# Patient Record
Sex: MALE | Race: WHITE
[De-identification: names, ages, dates, MRNs, and addresses within clinical notes are randomized per-mention and may not be internally consistent; named-entity substitution may affect disease eponyms.]

---

## 2020-01-17 ENCOUNTER — HOSPITAL ENCOUNTER (EMERGENCY)
Dept: HOSPITAL 72 - EMR | Age: 40
Discharge: HOME | End: 2020-01-17
Payer: COMMERCIAL

## 2020-01-17 VITALS — HEIGHT: 74 IN | BODY MASS INDEX: 23.74 KG/M2 | WEIGHT: 185 LBS

## 2020-01-17 VITALS — SYSTOLIC BLOOD PRESSURE: 145 MMHG | DIASTOLIC BLOOD PRESSURE: 82 MMHG

## 2020-01-17 VITALS — SYSTOLIC BLOOD PRESSURE: 157 MMHG | DIASTOLIC BLOOD PRESSURE: 99 MMHG
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
## 2020-01-17 DIAGNOSIS — M54.5: ICD-10-CM

## 2020-01-17 DIAGNOSIS — S30.0XXA: Primary | ICD-10-CM
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
## 2020-01-17 DIAGNOSIS — F98.8: ICD-10-CM
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
## 2020-01-17 PROCEDURE — 99284 EMERGENCY DEPT VISIT MOD MDM: CPT

## 2020-01-17 PROCEDURE — 72131 CT LUMBAR SPINE W/O DYE: CPT

## 2020-01-17 PROCEDURE — 72125 CT NECK SPINE W/O DYE: CPT

## 2020-01-17 PROCEDURE — 70450 CT HEAD/BRAIN W/O DYE: CPT

## 2020-01-17 NOTE — NUR
ED Nurse Note:



System down at this time, unable to scan medications. Pt given Toradol 30mg IM, 
Hydromorphone 0.5mg IM, Robaxin 750mg PO, Tylenol 1000mg PO, zofran 4mg PO, 
Decadron 12mg PO and Lidoderm patch 5% as ordered by ROBERT Baron. Pt 
tolerated medication well, charge rn aware of override in pyxis.

## 2020-01-17 NOTE — DIAGNOSTIC IMAGING REPORT
Indications: Head pain after falling

 

Technique: Spiral acquisitions obtained through the brain. Angled axial and coronal 5

x 5 mm slices were reconstructed. Total dose length product 1457 mGycm.  CTDI vol(s)

62 mGy. Dose reduction achieved using automated exposure control

 

Comparison: None.

 

Findings: No acute intracranial hemorrhage or edema. No mass effect nor midline

shift. Normal size ventricles and extra axial CSF spaces. Normal gray-white

differentiation. Intact calvarium. Visualized orbits and sinuses are unremarkable.

 

Impression: Negative

 

This agrees with the preliminary interpretation provided overnight by [facility]

teleradiology service.

 

 

 

The CT scanner at [facility] is accredited by the American College of

Radiology and the scans are performed using protocols designed to limit radiation

exposure to as low as reasonably achievable to attain images of sufficient resolution

adequate for diagnostic evaluation.

## 2020-01-17 NOTE — EMERGENCY ROOM REPORT
History of Present Illness


General


Chief Complaint:  Multiple Trauma/Fall


Source:  Patient





Present Illness


HPI


39-year-old male works at [employer] presents with acute slip and fall, fell 

on his back hit his head, no LOC he endorses some nausea no vomiting he 

endorses posterior head pain, no aggravating leaving factors severity is 

moderate, intermittent , patient also endorses right lower back pain sharp in 

nature aggravated with movement alleviated with rest severity is moderate no 

bowel bladder retention/incontinence no perineal numbness,


Allergies:  


Coded Allergies:  


     No Known Allergies (Unverified , 1/17/20)





Patient History


Past Medical History:  see triage record


Reviewed Nursing Documentation:  PMH: Agreed; PSxH: Agreed





Nursing Documentation-PMH


Past Medical History:  No Stated History


Hx Gastrointestinal Problems:  Yes - prilosec


Hx Neurological Problems:  Yes - ADD; inflammation od the back; PREP





Review of Systems


All Other Systems:  negative except mentioned in HPI





Physical Exam





Vital Signs








  Date Time  Temp Pulse Resp B/P (MAP) Pulse Ox O2 Delivery O2 Flow Rate FiO2


 


1/17/20 01:17 98.2 85 16 157/99 (118) 96 Room Air  








Sp02 EP Interpretation:  reviewed, normal


General Appearance:  well appearing, no apparent distress, alert


Head:  normocephalic, atraumatic


Eyes:  bilateral eye PERRL, bilateral eye EOMI


ENT:  uvula midline, moist mucus membranes


Neck:  supple, thyroid normal, no bony tend, supple/symm/no masses, tender 

lateral


Respiratory:  lungs clear, no respiratory distress, no retraction, no accessory 

muscle use


Cardiovascular #1:  normal peripheral pulses, regular rate, rhythm, no edema, 

no gallop, no murmur


Gastrointestinal:  non tender, soft, no guarding, no rebound


Musculoskeletal:  other - No midline tenderness no step-offs of the back, 

tenderness to palpation right lower back laterally paraspinally, 5-5 strength 

bilaterally ankle flexion extension, knee flexion extension, hip extension 

flexion, abduction and adduction of the hips, sensation grossly intact 

bilaterally lower extremities, patient is able to ambulate


Neurologic:  alert, oriented x3


Psychiatric:  mood/affect normal


Skin:  no rash, warm/dry





Medical Decision Making


Diagnostic Impression:  


 Primary Impression:  


 Fall


 Qualified Codes:  W19.XXXA - Unspecified fall, initial encounter


 Additional Impressions:  


 Back pain


 Qualified Codes:  M54.5 - Low back pain


 Contusion of muscle


ER Course


The patient presents with acute onset of back pain after fall earlier today.  

Clinically this patient can be ruled out for serious pathology given there is a 

completely normal neurological exam, no history of IV drug use, and no history 

of bowel or bladder incontinence, no perianal numbness/tingling, no 

constipation or urinary retention. Once the patient's pain was adequately 

controlled, the patient was able to ambulate and be discharged in stable 

condition with anticipatory guidance provided.


CT/MRI/US Diagnostic Results


CT/MRI/US Diagnostic Results :  


   Impression


Preliminary Findings Only  See Final Report For Complete Findings 


CT HEAD:





No ICH, mass effect or edema. No evidence of acute cortical stroke.





No midline shift or hydrocephalus.





Visualized sinuses and mastoid air cells are clear.





No acute calvarial fracture.











Radiologist: Martin Sarabia M.D. Phone: [phone]














Study ready at 04:08 and initial results transmitted at 04:22








Preliminary Findings Only  See Final Report For Complete Findings 


CT C SPINE:





No evidence of acute or healing fracture or malalignment. 





Degenerative changes of spine without critical central canal stenosis..





No airway narrowing, mass or adenopathy.





No apical pneumothorax.





Preliminary Findings Only  See Final Report For Complete Findings 


CT L SPINE:





No acute fracture or malalignment is identified. There is a chronic healed 

unilateral spondylolysis on the left at L4. There is mild degenerative disc 

disease at L4-5 and L5-S1 with mild generalized disc bulging and osteophytosis. 

This produces moderate to severe bilateral neural foraminal stenosis L4-5 as 

well as mild trefoil type central spinal stenosis. At L5-S1 there is severe 

right neural foraminal stenosis secondary to generalized disc bulging and 

osteophytosis. No focal disc protrusions are identified. The sacrum and 

visualized pelvis appear unremarkable. 











Radiologist: Tan Lambert MD Phone: [phone]














Study ready at 04:32 and initial results transmitted at 04:37











Last Vital Signs








  Date Time  Temp Pulse Resp B/P (MAP) Pulse Ox O2 Delivery O2 Flow Rate FiO2


 


1/17/20 01:30 98.2 85 16 157/99 96 Room Air  








Disposition:  HOME, SELF-CARE


Condition:  Stable


Scripts


Lidocaine Patch* (Lidoderm Patch*) 1 Each Adh..patch


1 PATCH TOPIC DAILY PRN for For Pain, #30 PATCH


   Patch(es) may remain in place for up to 12 hours in any 24-hour


   period.


   Prov: Varinder Baron MD         1/17/20 


Acetaminophen (Tylenol) 325 Mg Tablet


650 MG ORAL Q6H PRN for Prn Pain/Headache/Temp > 101, #60 TAB 0 Refills


   Prov: Varinder Baron MD         1/17/20 


Methocarbamol* (ROBAXIN-750*) 750 Mg Tablet


750 MG PO QID PRN for muscle spasm, #28 TAB 0 Refills


   Prov: Varinder Baron MD         1/17/20 


Naproxen* (NAPROSYN*) 250 Mg Tablet


250 MG ORAL TWICE A DAY PRN for For Pain, #60 TAB 0 Refills


   Prov: Varinder Baron MD         1/17/20


Referrals:  


[facility]


Departure Forms:  Return to Work      Return to Work Date:  Jan 24, 2020


   Work Restrictions:  No Heavy Lifting, No Prolonged Standing, Desk Work Only


Patient Instructions:  Back Pain, Adult, Easy-to-Read, Contusion, Easy-to-Read, 

Head Injury, Adult, Easy-to-Read, Low Back Strain With Rehab-SportsMed





Additional Instructions:  


The patient was provided with discharge instructions, notified to follow-up 

with a primary care doctor and or specialist in the next 24-48 hours, and to 

return to the ED if they have worsening of their symptoms. 





Please note that this report is being documented using DRAGON technology.


This can lead to erroneous entry secondary to incorrect interpretation by the 

dictating instrument.











Varinder Baron MD Jan 17, 2020 03:41

## 2020-01-17 NOTE — DIAGNOSTIC IMAGING REPORT
Indication: Trauma, neck pain after falling

 

Technique: Spiral acquisitions obtained through the cervical spine. No IV contrast

utilized. Multiplanar reconstructions were generated.  Total dose length product 191

mGycm. CTDIvol(s) 6 mGy. Dose reduction achieved using automated exposure control.

 

 

Comparison: none

 

Findings: The bony alignment is normal. Vertebral body heights are preserved. No

acute fractures. No dislocations.

 

There is mild degenerative change of the anterior atlantoaxial joint.

 

There is degenerative disc narrowing at C5-6. There is mild facet arthrosis. There

are posterior osteophytes which do not significantly narrow the spinal canal.

However, uncovertebral hypertrophy and degenerative spondylosis results in severe

bilateral neural foraminal stenosis at this level.

 

There is mild degenerative disc narrowing at C6-7. There is mild posterior disc bulge

which does not significantly compromise the spinal canal. There is severe right and

mild left neural foraminal stenosis. Uncinate hypertrophy.

 

At the remaining disc levels, no significant disc bulge or protrusion, spinal

stenosis, or neural foraminal narrowing.

 

There is maxillary sinus mucosal thickening on both sides. Included extra spinal soft

tissues are otherwise unremarkable

 

Impression: No acute bony trauma

 

Degenerative changes as detailed on a level by level basis above.

 

Sinus disease

 

 

 

The CT scanner at [facility] is accredited by the American College of

Radiology and the scans are performed using protocols designed to limit radiation

exposure to as low as reasonably achievable to attain images of sufficient resolution

adequate for diagnostic evaluation.

## 2020-01-17 NOTE — DIAGNOSTIC IMAGING REPORT
Indications: Lower back pain after falling

 

Technique: Spiral acquisitions obtained through the lumbar spine. Multiplanar

reconstructions were generated. No IV contrast utilized. Total dose length product

1108 mGycm. CTDIvol(s) 23 mGy.  Dose reduction achieved using automated exposure

control

 

 

Comparison: none  

 

Findings: Bony alignment is normal. No acute fractures. No dislocations. Vertebral

body heights are preserved.

 

There is mild degenerative disc narrowing at L4 and mild circumferential annular

bulge. This does not result in any significant spinal stenosis, does result in

minimal compromise of the bilateral neural foramina.

 

At L5-S1, there is degenerative disc narrowing. Posterior osteophytes are noted, do

not appear to significantly narrow the spinal canal. They do result in mild to

moderate compromise of the right neural foramen and mild compromise of the left

neural foramen.

 

Included extraspinal soft tissues are unremarkable.

 

Impression: No acute bony trauma

 

Degenerative changes, as described

 

This agrees with the preliminary interpretation provided overnight by [facility]

teleradiology service.

 

 

 

The CT scanner at [facility] is accredited by the American College of

Radiology and the scans are performed using protocols designed to limit radiation

exposure to as low as reasonably achievable to attain images of sufficient resolution

adequate for diagnostic evaluation.

## 2020-01-17 NOTE — NUR
ED Nurse Note:



Pt walked into ED for c/o back and neck pain s/p fall at work. Pt states he was 
at work around 2345 last night when he slipped on the wet pavement and fell 
landing on his back and hit his head. Pt does not report LOC that he is aware 
of. Pt reports pain on R side of back and neck. Pt is aaox4, no cardiac or 
respiratory distress noted.